# Patient Record
Sex: FEMALE | Race: WHITE
[De-identification: names, ages, dates, MRNs, and addresses within clinical notes are randomized per-mention and may not be internally consistent; named-entity substitution may affect disease eponyms.]

---

## 2021-01-20 ENCOUNTER — HOSPITAL ENCOUNTER (EMERGENCY)
Dept: HOSPITAL 12 - ER | Age: 61
Discharge: ANOTHER HOSPITAL | End: 2021-01-20
Payer: COMMERCIAL

## 2021-01-20 VITALS — BODY MASS INDEX: 36.65 KG/M2 | WEIGHT: 220 LBS | HEIGHT: 65 IN

## 2021-01-20 DIAGNOSIS — Z20.822: ICD-10-CM

## 2021-01-20 DIAGNOSIS — R11.2: ICD-10-CM

## 2021-01-20 DIAGNOSIS — R73.9: ICD-10-CM

## 2021-01-20 DIAGNOSIS — R94.31: ICD-10-CM

## 2021-01-20 DIAGNOSIS — R79.1: ICD-10-CM

## 2021-01-20 DIAGNOSIS — R51.9: Primary | ICD-10-CM

## 2021-01-20 DIAGNOSIS — I10: ICD-10-CM

## 2021-01-20 LAB
BASOPHILS # BLD AUTO: 0.1 K/UL (ref 0–8)
BASOPHILS NFR BLD AUTO: 0.9 % (ref 0–2)
BUN SERPL-MCNC: 19 MG/DL (ref 7–18)
CHLORIDE SERPL-SCNC: 101 MMOL/L (ref 98–107)
CO2 SERPL-SCNC: 26 MMOL/L (ref 21–32)
CREAT SERPL-MCNC: 1 MG/DL (ref 0.6–1.3)
EOSINOPHIL # BLD AUTO: 0.1 K/UL (ref 0–0.7)
EOSINOPHIL NFR BLD AUTO: 1.7 % (ref 0–7)
GLUCOSE SERPL-MCNC: 295 MG/DL (ref 74–106)
HCT VFR BLD AUTO: 40 % (ref 31.2–41.9)
HGB BLD-MCNC: 13.1 G/DL (ref 10.9–14.3)
LYMPHOCYTES # BLD AUTO: 1.3 K/UL (ref 20–40)
LYMPHOCYTES NFR BLD AUTO: 21.4 % (ref 20.5–51.5)
MCH RBC QN AUTO: 29.7 UUG (ref 24.7–32.8)
MCHC RBC AUTO-ENTMCNC: 33 G/DL (ref 32.3–35.6)
MCV RBC AUTO: 90.9 FL (ref 75.5–95.3)
MONOCYTES # BLD AUTO: 0.3 K/UL (ref 2–10)
MONOCYTES NFR BLD AUTO: 5 % (ref 0–11)
NEUTROPHILS # BLD AUTO: 4.5 K/UL (ref 1.8–8.9)
NEUTROPHILS NFR BLD AUTO: 71 % (ref 38.5–71.5)
PLATELET # BLD AUTO: 178 K/UL (ref 179–408)
POTASSIUM SERPL-SCNC: 3.8 MMOL/L (ref 3.5–5.1)
RBC # BLD AUTO: 4.4 MIL/UL (ref 3.63–4.92)
WBC # BLD AUTO: 6.3 K/UL (ref 3.8–11.8)

## 2021-01-20 PROCEDURE — 80048 BASIC METABOLIC PNL TOTAL CA: CPT

## 2021-01-20 PROCEDURE — 70496 CT ANGIOGRAPHY HEAD: CPT

## 2021-01-20 PROCEDURE — 93005 ELECTROCARDIOGRAM TRACING: CPT

## 2021-01-20 PROCEDURE — 96374 THER/PROPH/DIAG INJ IV PUSH: CPT

## 2021-01-20 PROCEDURE — 85025 COMPLETE CBC W/AUTO DIFF WBC: CPT

## 2021-01-20 PROCEDURE — 96375 TX/PRO/DX INJ NEW DRUG ADDON: CPT

## 2021-01-20 PROCEDURE — 84484 ASSAY OF TROPONIN QUANT: CPT

## 2021-01-20 PROCEDURE — 71045 X-RAY EXAM CHEST 1 VIEW: CPT

## 2021-01-20 PROCEDURE — 96361 HYDRATE IV INFUSION ADD-ON: CPT

## 2021-01-20 PROCEDURE — 99285 EMERGENCY DEPT VISIT HI MDM: CPT

## 2021-01-20 PROCEDURE — A4663 DIALYSIS BLOOD PRESSURE CUFF: HCPCS

## 2021-01-20 PROCEDURE — 36415 COLL VENOUS BLD VENIPUNCTURE: CPT

## 2021-01-20 PROCEDURE — 96376 TX/PRO/DX INJ SAME DRUG ADON: CPT

## 2021-01-20 PROCEDURE — 87426 SARSCOV CORONAVIRUS AG IA: CPT

## 2021-01-20 PROCEDURE — 85730 THROMBOPLASTIN TIME PARTIAL: CPT

## 2021-01-20 NOTE — NUR
Pandemic disaster charting with "out of ratio" staffing in ER:



Patient is sleeping, easily arousble, AOx4 when awake, respiration:easy, moving 
all extremities, patient reports significant decreased headaches now, pending 
insurance transfer information@this time.

## 2021-01-20 NOTE — NUR
Patient is resting comfortably on gurney with eyes closed, NAD, still waiting 
for transfer information from patient's insurance

## 2021-01-20 NOTE — NUR
Patient will tranfer to outside Facility per insurance request: [facility]



Physician: Marcelino ([phone])



Location: ER department



RN : Aura accepted hands off report@[id number]



[facility] ambulance TUL=[id number]

## 2021-01-20 NOTE — NUR
Transfer information was received by ER registration staff Madhav.  Patient and 
family were updated accordingly.

## 2021-01-20 NOTE — NUR
Patient BIB  from home for waking up with DIAZ that started about 3hrs PTA 
with N/V. Patient upon arrival A/Ox3 with clear speech.